# Patient Record
Sex: MALE | Race: AMERICAN INDIAN OR ALASKA NATIVE
[De-identification: names, ages, dates, MRNs, and addresses within clinical notes are randomized per-mention and may not be internally consistent; named-entity substitution may affect disease eponyms.]

---

## 2022-05-28 ENCOUNTER — HOSPITAL ENCOUNTER (EMERGENCY)
Dept: HOSPITAL 5 - ED | Age: 52
Discharge: HOME | End: 2022-05-28
Payer: COMMERCIAL

## 2022-05-28 VITALS — SYSTOLIC BLOOD PRESSURE: 118 MMHG | DIASTOLIC BLOOD PRESSURE: 65 MMHG

## 2022-05-28 DIAGNOSIS — G51.0: Primary | ICD-10-CM

## 2022-05-28 DIAGNOSIS — Z98.890: ICD-10-CM

## 2022-05-28 DIAGNOSIS — J44.9: ICD-10-CM

## 2022-05-28 LAB
ALBUMIN SERPL-MCNC: 4.4 G/DL (ref 3.9–5)
ALT SERPL-CCNC: 38 UNITS/L (ref 7–56)
APTT BLD: 34.3 SEC. (ref 24.2–36.6)
BASOPHILS # (AUTO): 0 K/MM3 (ref 0–0.1)
BASOPHILS NFR BLD AUTO: 0.7 % (ref 0–1.8)
BUN SERPL-MCNC: 13 MG/DL (ref 9–20)
BUN/CREAT SERPL: 10 %
CALCIUM SERPL-MCNC: 9.5 MG/DL (ref 8.4–10.2)
EOSINOPHIL # BLD AUTO: 0.2 K/MM3 (ref 0–0.4)
EOSINOPHIL NFR BLD AUTO: 3.5 % (ref 0–4.3)
HCT VFR BLD CALC: 47.3 % (ref 35.5–45.6)
HEMOLYSIS INDEX: 12
HGB BLD-MCNC: 16.3 GM/DL (ref 11.8–15.2)
INR PPP: 0.91 (ref 0.87–1.13)
LYMPHOCYTES # BLD AUTO: 1.6 K/MM3 (ref 1.2–5.4)
LYMPHOCYTES NFR BLD AUTO: 32.7 % (ref 13.4–35)
MCHC RBC AUTO-ENTMCNC: 35 % (ref 32–34)
MCV RBC AUTO: 94 FL (ref 84–94)
MONOCYTES # (AUTO): 0.5 K/MM3 (ref 0–0.8)
MONOCYTES % (AUTO): 9.3 % (ref 0–7.3)
PLATELET # BLD: 121 K/MM3 (ref 140–440)
RBC # BLD AUTO: 5.03 M/MM3 (ref 3.65–5.03)

## 2022-05-28 PROCEDURE — 85610 PROTHROMBIN TIME: CPT

## 2022-05-28 PROCEDURE — 85025 COMPLETE CBC W/AUTO DIFF WBC: CPT

## 2022-05-28 PROCEDURE — 71045 X-RAY EXAM CHEST 1 VIEW: CPT

## 2022-05-28 PROCEDURE — 93005 ELECTROCARDIOGRAM TRACING: CPT

## 2022-05-28 PROCEDURE — 36415 COLL VENOUS BLD VENIPUNCTURE: CPT

## 2022-05-28 PROCEDURE — 85730 THROMBOPLASTIN TIME PARTIAL: CPT

## 2022-05-28 PROCEDURE — 70450 CT HEAD/BRAIN W/O DYE: CPT

## 2022-05-28 PROCEDURE — 85670 THROMBIN TIME PLASMA: CPT

## 2022-05-28 PROCEDURE — 96360 HYDRATION IV INFUSION INIT: CPT

## 2022-05-28 PROCEDURE — 82553 CREATINE MB FRACTION: CPT

## 2022-05-28 PROCEDURE — 84484 ASSAY OF TROPONIN QUANT: CPT

## 2022-05-28 PROCEDURE — 99284 EMERGENCY DEPT VISIT MOD MDM: CPT

## 2022-05-28 PROCEDURE — 82550 ASSAY OF CK (CPK): CPT

## 2022-05-28 PROCEDURE — 80053 COMPREHEN METABOLIC PANEL: CPT

## 2022-05-28 NOTE — CAT SCAN REPORT
CT HEAD WITHOUT CONTRAST



INDICATION / CLINICAL INFORMATION: RT SIDED WEAKNESS.



TECHNIQUE: All CT scans at this location are performed using CT dose reduction for ALARA by means of 
automated exposure control. 



COMPARISON: None available.



FINDINGS:

BRAIN PARENCHYMA: No acute intracranial hemorrhage. No evidence of recent infarct. No mass effect or 
midline shift.

VENTRICULAR SYSTEM/EXTRA-AXIAL SPACES: Ventricles are normal for age. No extra-axial fluid collection
. 

ORBITS: Normal as visualized.

SKELETAL SYSTEM/SOFT TISSUES: Normal bones and soft tissues.

PARANASAL SINUSES/MASTOID AIR CELLS: No significant abnormality.



ADDITIONAL FINDINGS: None.



IMPRESSION:

No acute intracranial abnormality by CT.



Signer Name: Pardeep Cross MD 

Signed: 5/28/2022 10:19 AM

Workstation Name: [id number]

## 2022-05-28 NOTE — CONSULTATION
History of Present Illness


History of present illness: 





[facility] Consult Note





# Demographics


Consult Type: Acute Stroke Level 2 (4.5-24 hrs)





Patient Location: Emergency Room





First Name: Renato





Last Name: Eulogio Verdugo





YOB: 1970





Age: 51





Gender: Male





Facility: [facility]





Time of Initial Page (Eastern Time): 05/28/2022, 09:38





Time of Return Call (Eastern Time): 05/28/2022, 09:53








# HPI


Chief Complaint:


facial droop





History: 51M presents with facial droop. Developed numbness and tearing in the 

right face last night. Drooping and drooling noticed this morning.








# Scores


Time of exam and NIHSS (Eastern Time): 05/28/2022, 09:55





Level of Consciousness 1a: [0] = Alert; keenly responsive





LOC Questions 1b: [0] = Answers both questions correctly





LOC Commands 1c: [0] = Performs both tasks correctly





Best Gaze 2: [0] = Normal





Visual 3: [0] = No visual loss





Facial Palsy 4: [3] = Complete paralysis





Motor Arm Left 5a: [0] = No drift





Motor Arm Right 5b: [0] = No drift





Motor Leg Left 6a: [0] = No drift





Motor Leg Right 6b: [0] = No drift





Limb Ataxia 7: [0] = Absent





Sensory 8: [0] = Normal





Best Language 9: [0] = No aphasia





Dysarthria 10: [0] = Normal





Extinction and Inattention 11: [0] = No abnormality





NIHSS Total: 3








# Data


Time Head CT personally read by me (Eastern Time): 05/28/2022, 10:00





Head CT:


no bleed


preliminarily reviewed by me, please refer to radiology read for official 

reading








# Assessment


Impression:


Hornick Palsy








# Plan


Thrombolytic/Intervention: NOT IV Thrombolysis or IA Intervention candidate





Thrombolytic/Intraarterial Exclusion:


IV thrombolytic and IA intervention considered but not recommended as this 

patient's symptoms are not clinically consistent with an assumed diagnosis of 

stroke





Medication:


prednisone 1mg/kg x7 days


valacyclovir 1g q8 x7 days


Lubricating eye drops/ointment





Additional Recommendations: Eye patch/tape to limit corneal exposure risk from 

incomplete eye lid closure





Disposition: discharge








# Logistics


Telemedicine: Interactive 2 way audio and visual telecommunication technology 

was utilized during this visit








Electronically signed at 05/28/2022 10:09 (Eastern Time) by Adelfo Messina MD





Medications and Allergies


                                    Allergies











Allergy/AdvReac Type Severity Reaction Status Date / Time


 


No Known Allergies Allergy   Unverified 10/07/15 09:50











Active Meds: 


Active Medications





Sodium Chloride (Nacl 0.9% 1000 Ml)  1,000 mls @ 999 mls/hr IV BOLUS ONE


   Stop: 05/28/22 10:53

## 2022-05-28 NOTE — XRAY REPORT
. XR chest 1V ap



INDICATION / CLINICAL INFORMATION: Dyspnea.



COMPARISON: None available.



FINDINGS:



SUPPORT DEVICES: None.

HEART /PULMONARY VASCULATURE: No significant abnormality. 

LUNGS / PLEURA: Lungs are hyperexpanded but appear clear consolidation. There is flattening of the di
aphragm and blunting of the costophrenic sulci, likely chronic. No pneumothorax. 



ADDITIONAL FINDINGS: No significant additional findings.



IMPRESSION:

COPD. No evidence of acute process.



Signer Name: Pardeep Cross MD 

Signed: 5/28/2022 10:16 AM

Workstation Name: [id number]-

## 2022-05-28 NOTE — EMERGENCY DEPARTMENT REPORT
ED General Adult HPI





- General


Chief complaint: Neuro Symptoms/Deficit


Stated complaint: POSSIBLE STROKE


Time Seen by Provider: 05/28/22 09:53


Source: patient


Mode of arrival: Ambulatory


Limitations: No Limitations





- History of Present Illness


Initial comments: 





left side facial droop , started around 9 pm last night no weakness no tingling 

numbness 


-: Sudden, hour(s) (12)


Location: face, mouth, eyes


Radiation: non-radiation


Consistency: constant


Improves with: none


Worsens with: none


Associated Symptoms: denies: denies other symptoms, confusion, chest pain, cough





- Related Data


                                  Previous Rx's











 Medication  Instructions  Recorded  Last Taken  Type


 


Carboxymethylcellulos/Glycerin 15 ml OP 4XD #1 drops 05/28/22 Unknown Rx





[Lubricant 0.5-0.9% Eye Drops]    


 


Eye Patch 1 each MC DAILY #1 each 05/28/22 Unknown Rx


 


Valacyclovir HCl [Valtrex] 1,000 mg PO TID #15 05/28/22 Unknown Rx


 


predniSONE [Deltasone] 50 mg PO QDAY #7 tab 05/28/22 Unknown Rx











                                    Allergies











Allergy/AdvReac Type Severity Reaction Status Date / Time


 


No Known Allergies Allergy   Unverified 10/07/15 09:50














ED Review of Systems


ROS: 


Stated complaint: POSSIBLE STROKE


Other details as noted in HPI





Constitutional: denies: chills, fever


Eyes: denies: eye pain, eye discharge, vision change


ENT: denies: ear pain, throat pain


Respiratory: denies: cough, shortness of breath, wheezing


Cardiovascular: denies: chest pain, palpitations


Endocrine: no symptoms reported


Gastrointestinal: denies: abdominal pain, nausea, diarrhea


Genitourinary: denies: urgency, dysuria


Musculoskeletal: denies: back pain, joint swelling, arthralgia


Skin: denies: rash, lesions


Neurological: denies: headache, weakness, paresthesias


Psychiatric: denies: anxiety, depression


Hematological/Lymphatic: denies: easy bleeding, easy bruising





ED Past Medical Hx





- Past Medical History


Previous Medical History?: Yes


Hx Heart Attack/AMI: Yes


Hx COPD: Yes





- Surgical History


Past Surgical History?: Yes


Hx Coronary Stent: Yes





- Social History


Smoking Status: Never Smoker





- Medications


Home Medications: 


                                Home Medications











 Medication  Instructions  Recorded  Confirmed  Last Taken  Type


 


Carboxymethylcellulos/Glycerin 15 ml OP 4XD #1 drops 05/28/22  Unknown Rx





[Lubricant 0.5-0.9% Eye Drops]     


 


Eye Patch 1 each MC DAILY #1 each 05/28/22  Unknown Rx


 


Valacyclovir HCl [Valtrex] 1,000 mg PO TID #15 05/28/22  Unknown Rx


 


predniSONE [Deltasone] 50 mg PO QDAY #7 tab 05/28/22  Unknown Rx














ED Physical Exam





- General


Limitations: No Limitations


General appearance: alert, in no apparent distress, other (left facial droop )





- Head


Head exam: Present: atraumatic, normocephalic





- Eye


Eye exam: Present: normal appearance





- ENT


ENT exam: Present: mucous membranes moist





- Neck


Neck exam: Present: normal inspection





- Respiratory


Respiratory exam: Present: normal lung sounds bilaterally.  Absent: respiratory 

distress





- Cardiovascular


Cardiovascular Exam: Present: regular rate, normal rhythm.  Absent: systolic 

murmur, diastolic murmur, rubs, gallop





- GI/Abdominal


GI/Abdominal exam: Present: soft, normal bowel sounds





- Rectal


Rectal exam: Present: deferred





- Extremities Exam


Extremities exam: Present: normal inspection





- Back Exam


Back exam: Present: normal inspection





- Neurological Exam


Neurological exam: Present: alert, oriented X3





- Expanded Neurological Exam


  ** Expanded


Patient oriented to: Present: person, place, time


Speech: Present: fluid speech


Cranial nerves: Facial Palsy with Forehead Movement: Abnormal Right


Best Eye Response ([place]): (4) open spontaneously


Best Motor Response ([place]): (6) obeys commands


Best Verbal Response ([place]): (5) oriented


[place] Total: 15





- Psychiatric


Psychiatric exam: Present: normal affect, normal mood





- Skin


Skin exam: Present: warm, dry, intact, normal color.  Absent: rash





ED Course





                                   Vital Signs











  05/28/22 05/28/22





  10:30 10:45


 


Temperature  98.9 F


 


Pulse Rate  64


 


Respiratory 18 18





Rate  


 


Blood Pressure  129/79


 


O2 Sat by Pulse 99 100





Oximetry  














ED Medical Decision Making





- Lab Data


Result diagrams: 


                                 05/28/22 10:06








- EKG Data


-: EKG Interpreted by Me


EKG shows normal: sinus rhythm


Rate: normal





- Radiology Data


Radiology results: report reviewed, image reviewed





- Medical Decision Making





work up negative fro stroke, symtpoms of bells palsy , neurologist assessed pat

ient 


Critical care attestation.: 


If time is entered above; I have spent that time in minutes in the direct care 

of this critically ill patient, excluding procedure time.








ED Disposition


Clinical Impression: 


 Bell's palsy





Disposition: 01 HOME / SELF CARE / HOMELESS


Is pt being admited?: No


Does the pt Need Aspirin: No


Condition: Stable


Instructions:  Carreon Palsy, Adult


Referrals: 


PRIMARY CARE,MD [Primary Care Provider] - 3-5 Days

## 2023-11-17 ENCOUNTER — OFFICE VISIT (OUTPATIENT)
Dept: URBAN - METROPOLITAN AREA CLINIC 92 | Facility: CLINIC | Age: 53
End: 2023-11-17

## 2023-11-17 RX ORDER — ASPIRIN 81 MG/1
TABLET, COATED ORAL
Qty: 0 | Refills: 0 | COMMUNITY
Start: 1900-01-01

## 2023-11-17 RX ORDER — FLUTICASONE FUROATE AND VILANTEROL TRIFENATATE 200; 25 UG/1; UG/1
INHALE 1 PUFF BY INHALATION ROUTE ONCE DAILY AT THE SAME TIME EACH DAY POWDER RESPIRATORY (INHALATION) 1
Qty: 1 | Refills: 0 | COMMUNITY
Start: 1900-01-01

## 2023-11-17 RX ORDER — UMECLIDINIUM 62.5 UG/1
INHALE 1 PUFF (62.5 MCG) BY INHALATION ROUTE ONCE DAILY AT THE SAME TIME EACH DAY AEROSOL, POWDER ORAL 1
Qty: 1 | Refills: 0 | COMMUNITY
Start: 1900-01-01

## 2023-11-17 RX ORDER — ATORVASTATIN CALCIUM 40 MG/1
TABLET, FILM COATED ORAL
Qty: 0 | Refills: 0 | COMMUNITY
Start: 1900-01-01

## 2023-11-17 RX ORDER — CLOPIDOGREL 75 MG/1
TAKE 1 TABLET (75 MG) BY ORAL ROUTE ONCE DAILY TABLET ORAL 1
Qty: 0 | Refills: 0 | COMMUNITY
Start: 1900-01-01

## 2023-11-17 RX ORDER — LISINOPRIL 10 MG/1
TABLET ORAL
Qty: 0 | Refills: 0 | COMMUNITY
Start: 1900-01-01

## 2023-11-17 RX ORDER — ALBUTEROL SULFATE 5 MG/ML
SOLUTION, NON-ORAL INHALATION
Qty: 0 | Refills: 0 | COMMUNITY
Start: 1900-01-01

## 2023-11-17 RX ORDER — ABACAVIR SULFATE, DOLUTEGRAVIR SODIUM, LAMIVUDINE 600; 50; 300 MG/1; MG/1; MG/1
TAKE 1 TABLET BY ORAL ROUTE ONCE DAILY TABLET, FILM COATED ORAL 1
Qty: 0 | Refills: 0 | COMMUNITY
Start: 1900-01-01

## 2023-11-21 ENCOUNTER — TELEPHONE ENCOUNTER (OUTPATIENT)
Dept: URBAN - METROPOLITAN AREA CLINIC 92 | Facility: CLINIC | Age: 53
End: 2023-11-21

## 2023-11-21 ENCOUNTER — CLAIMS CREATED FROM THE CLAIM WINDOW (OUTPATIENT)
Dept: URBAN - METROPOLITAN AREA CLINIC 92 | Facility: CLINIC | Age: 53
End: 2023-11-21
Payer: COMMERCIAL

## 2023-11-21 VITALS
BODY MASS INDEX: 21.94 KG/M2 | HEIGHT: 67 IN | TEMPERATURE: 96.1 F | HEART RATE: 57 BPM | WEIGHT: 139.8 LBS | SYSTOLIC BLOOD PRESSURE: 128 MMHG | DIASTOLIC BLOOD PRESSURE: 87 MMHG

## 2023-11-21 DIAGNOSIS — K59.04 CHRONIC IDIOPATHIC CONSTIPATION: ICD-10-CM

## 2023-11-21 DIAGNOSIS — Z80.0 FAMILY HISTORY OF COLON CANCER: ICD-10-CM

## 2023-11-21 DIAGNOSIS — Z86.010 PERSONAL HISTORY OF COLONIC POLYPS: ICD-10-CM

## 2023-11-21 PROBLEM — 82934008: Status: ACTIVE | Noted: 2023-11-21

## 2023-11-21 PROBLEM — 428283002: Status: ACTIVE | Noted: 2023-11-21

## 2023-11-21 PROCEDURE — 99243 OFF/OP CNSLTJ NEW/EST LOW 30: CPT

## 2023-11-21 PROCEDURE — 99203 OFFICE O/P NEW LOW 30 MIN: CPT

## 2023-11-21 RX ORDER — ALBUTEROL SULFATE 5 MG/ML
SOLUTION, NON-ORAL INHALATION
Qty: 0 | Refills: 0 | Status: ACTIVE | COMMUNITY
Start: 1900-01-01

## 2023-11-21 RX ORDER — ASPIRIN 81 MG/1
TABLET, COATED ORAL
Qty: 0 | Refills: 0 | Status: ACTIVE | COMMUNITY
Start: 1900-01-01

## 2023-11-21 RX ORDER — POLYETHYLENE GLYCOL-3350 AND ELECTROLYTES WITH FLAVOR PACK 240; 5.84; 2.98; 6.72; 22.72 G/278.26G; G/278.26G; G/278.26G; G/278.26G; G/278.26G
4000ML POWDER, FOR SOLUTION ORAL
Qty: 4000 MILLILITER | Refills: 0 | OUTPATIENT
Start: 2023-11-21 | End: 2023-11-22

## 2023-11-21 RX ORDER — FLUTICASONE FUROATE AND VILANTEROL TRIFENATATE 200; 25 UG/1; UG/1
INHALE 1 PUFF BY INHALATION ROUTE ONCE DAILY AT THE SAME TIME EACH DAY POWDER RESPIRATORY (INHALATION) 1
Qty: 1 | Refills: 0 | Status: ACTIVE | COMMUNITY
Start: 1900-01-01

## 2023-11-21 RX ORDER — CLOPIDOGREL 75 MG/1
TAKE 1 TABLET (75 MG) BY ORAL ROUTE ONCE DAILY TABLET ORAL 1
Qty: 0 | Refills: 0 | Status: ACTIVE | COMMUNITY
Start: 1900-01-01

## 2023-11-21 RX ORDER — UMECLIDINIUM 62.5 UG/1
INHALE 1 PUFF (62.5 MCG) BY INHALATION ROUTE ONCE DAILY AT THE SAME TIME EACH DAY AEROSOL, POWDER ORAL 1
Qty: 1 | Refills: 0 | Status: ACTIVE | COMMUNITY
Start: 1900-01-01

## 2023-11-21 RX ORDER — LISINOPRIL 10 MG/1
TABLET ORAL
Qty: 0 | Refills: 0 | Status: ACTIVE | COMMUNITY
Start: 1900-01-01

## 2023-11-21 RX ORDER — ABACAVIR SULFATE, DOLUTEGRAVIR SODIUM, LAMIVUDINE 600; 50; 300 MG/1; MG/1; MG/1
TAKE 1 TABLET BY ORAL ROUTE ONCE DAILY TABLET, FILM COATED ORAL 1
Qty: 0 | Refills: 0 | Status: ACTIVE | COMMUNITY
Start: 1900-01-01

## 2023-11-21 RX ORDER — ATORVASTATIN CALCIUM 40 MG/1
TABLET, FILM COATED ORAL
Qty: 0 | Refills: 0 | Status: ACTIVE | COMMUNITY
Start: 1900-01-01

## 2023-11-21 NOTE — HPI-TODAY'S VISIT:
This patient was referred by VANESA Vogel  for an evaluation of partial bowel obstruction.  A copy of this will be sent to the referring provider. PMH includes CAD (s/p PCI x3 to RCA 8/2016), and chronic anticoagulation currently on aspirin and Plavix, HIV on HAART, Triumeq, levels undetected.   Patient notes when he uses the restroom he does not have complete BM. Symptoms ongoing over 5 years, just brought it to his PCPs attention. In a week, he has a BM on 3-4 days out of the week. Admits to mild abdominal discomfort. Denies diarrhea, hematochezia, or melena. Trialed Dulcolax and MiraLax with limited benefit. Denies upper GI issues. Patient is a former smoker. Rare alcohol use. Daily marijuana use.  Had a colonoscopy in 2017  which revealed polyps per patient, done at [place]. Brother dx with Stage IV colon cancer last year, age 58.    Dr. Yvonne Triplett is his cardiologist. No pacemaker, no home O2. Has a nebulizer for COPD, last used 9-10 months ago.

## 2023-12-06 ENCOUNTER — TELEPHONE ENCOUNTER (OUTPATIENT)
Dept: URBAN - METROPOLITAN AREA CLINIC 92 | Facility: CLINIC | Age: 53
End: 2023-12-06

## 2024-03-05 ENCOUNTER — LAB (OUTPATIENT)
Dept: URBAN - METROPOLITAN AREA CLINIC 4 | Facility: CLINIC | Age: 54
End: 2024-03-05
Payer: COMMERCIAL

## 2024-03-05 ENCOUNTER — COLON (OUTPATIENT)
Dept: URBAN - METROPOLITAN AREA SURGERY CENTER 16 | Facility: SURGERY CENTER | Age: 54
End: 2024-03-05
Payer: COMMERCIAL

## 2024-03-05 DIAGNOSIS — A63.0 ANOGENITAL (VENEREAL) WARTS: ICD-10-CM

## 2024-03-05 DIAGNOSIS — Z80.0 BROTHER AT YOUNG AGE FAMILY HISTORY OF COLON CANCER: ICD-10-CM

## 2024-03-05 DIAGNOSIS — A63.0 ANAL CONDYLOMA: ICD-10-CM

## 2024-03-05 DIAGNOSIS — Z12.11 COLON CANCER SCREENING: ICD-10-CM

## 2024-03-05 PROCEDURE — 45380 COLONOSCOPY AND BIOPSY: CPT | Performed by: INTERNAL MEDICINE

## 2024-03-05 PROCEDURE — 88342 IMHCHEM/IMCYTCHM 1ST ANTB: CPT | Performed by: PATHOLOGY

## 2024-03-05 PROCEDURE — 88341 IMHCHEM/IMCYTCHM EA ADD ANTB: CPT | Performed by: PATHOLOGY

## 2024-03-05 PROCEDURE — 88305 TISSUE EXAM BY PATHOLOGIST: CPT | Performed by: PATHOLOGY

## 2024-03-05 RX ORDER — FLUTICASONE FUROATE AND VILANTEROL TRIFENATATE 200; 25 UG/1; UG/1
INHALE 1 PUFF BY INHALATION ROUTE ONCE DAILY AT THE SAME TIME EACH DAY POWDER RESPIRATORY (INHALATION) 1
Qty: 1 | Refills: 0 | Status: ACTIVE | COMMUNITY
Start: 1900-01-01

## 2024-03-05 RX ORDER — CLOPIDOGREL 75 MG/1
TAKE 1 TABLET (75 MG) BY ORAL ROUTE ONCE DAILY TABLET ORAL 1
Qty: 0 | Refills: 0 | Status: ACTIVE | COMMUNITY
Start: 1900-01-01

## 2024-03-05 RX ORDER — LISINOPRIL 10 MG/1
TABLET ORAL
Qty: 0 | Refills: 0 | Status: ACTIVE | COMMUNITY
Start: 1900-01-01

## 2024-03-05 RX ORDER — ABACAVIR SULFATE, DOLUTEGRAVIR SODIUM, LAMIVUDINE 600; 50; 300 MG/1; MG/1; MG/1
TAKE 1 TABLET BY ORAL ROUTE ONCE DAILY TABLET, FILM COATED ORAL 1
Qty: 0 | Refills: 0 | Status: ACTIVE | COMMUNITY
Start: 1900-01-01

## 2024-03-05 RX ORDER — ASPIRIN 81 MG/1
TABLET, COATED ORAL
Qty: 0 | Refills: 0 | Status: ACTIVE | COMMUNITY
Start: 1900-01-01

## 2024-03-05 RX ORDER — ATORVASTATIN CALCIUM 40 MG/1
TABLET, FILM COATED ORAL
Qty: 0 | Refills: 0 | Status: ACTIVE | COMMUNITY
Start: 1900-01-01

## 2024-03-05 RX ORDER — UMECLIDINIUM 62.5 UG/1
INHALE 1 PUFF (62.5 MCG) BY INHALATION ROUTE ONCE DAILY AT THE SAME TIME EACH DAY AEROSOL, POWDER ORAL 1
Qty: 1 | Refills: 0 | Status: ACTIVE | COMMUNITY
Start: 1900-01-01

## 2024-03-05 RX ORDER — ALBUTEROL SULFATE 5 MG/ML
SOLUTION, NON-ORAL INHALATION
Qty: 0 | Refills: 0 | Status: ACTIVE | COMMUNITY
Start: 1900-01-01

## 2024-03-21 ENCOUNTER — OV EP (OUTPATIENT)
Dept: URBAN - METROPOLITAN AREA CLINIC 92 | Facility: CLINIC | Age: 54
End: 2024-03-21
Payer: COMMERCIAL

## 2024-03-21 VITALS
WEIGHT: 137 LBS | DIASTOLIC BLOOD PRESSURE: 89 MMHG | SYSTOLIC BLOOD PRESSURE: 141 MMHG | HEART RATE: 68 BPM | BODY MASS INDEX: 21.5 KG/M2 | HEIGHT: 67 IN | TEMPERATURE: 97 F

## 2024-03-21 DIAGNOSIS — Z86.010 PERSONAL HISTORY OF COLONIC POLYPS: ICD-10-CM

## 2024-03-21 DIAGNOSIS — K59.04 CHRONIC IDIOPATHIC CONSTIPATION: ICD-10-CM

## 2024-03-21 DIAGNOSIS — A63.0 ANAL CONDYLOMA: ICD-10-CM

## 2024-03-21 DIAGNOSIS — Z80.0 FAMILY HISTORY OF COLON CANCER: ICD-10-CM

## 2024-03-21 PROBLEM — 240597001: Status: ACTIVE | Noted: 2024-03-21

## 2024-03-21 PROCEDURE — 99213 OFFICE O/P EST LOW 20 MIN: CPT

## 2024-03-21 RX ORDER — ALBUTEROL SULFATE 5 MG/ML
SOLUTION, NON-ORAL INHALATION
Qty: 0 | Refills: 0 | Status: ACTIVE | COMMUNITY
Start: 1900-01-01

## 2024-03-21 RX ORDER — ABACAVIR SULFATE, DOLUTEGRAVIR SODIUM, LAMIVUDINE 600; 50; 300 MG/1; MG/1; MG/1
TAKE 1 TABLET BY ORAL ROUTE ONCE DAILY TABLET, FILM COATED ORAL 1
Qty: 0 | Refills: 0 | Status: ACTIVE | COMMUNITY
Start: 1900-01-01

## 2024-03-21 RX ORDER — LINACLOTIDE 145 UG/1
1 CAPSULE AT LEAST 30 MINUTES BEFORE THE FIRST MEAL OF THE DAY ON AN EMPTY STOMACH CAPSULE, GELATIN COATED ORAL ONCE A DAY
Qty: 90 CAPSULE | Refills: 3 | OUTPATIENT
Start: 2024-03-21 | End: 2025-03-16

## 2024-03-21 RX ORDER — FLUTICASONE FUROATE, UMECLIDINIUM BROMIDE AND VILANTEROL TRIFENATATE 100; 62.5; 25 UG/1; UG/1; UG/1
1 PUFF POWDER RESPIRATORY (INHALATION) ONCE A DAY
Status: ACTIVE | COMMUNITY

## 2024-03-21 RX ORDER — CLOPIDOGREL 75 MG/1
TAKE 1 TABLET (75 MG) BY ORAL ROUTE ONCE DAILY TABLET ORAL 1
Qty: 0 | Refills: 0 | Status: ACTIVE | COMMUNITY
Start: 1900-01-01

## 2024-03-21 RX ORDER — LISINOPRIL 10 MG/1
TABLET ORAL
Qty: 0 | Refills: 0 | Status: ON HOLD | COMMUNITY
Start: 1900-01-01

## 2024-03-21 RX ORDER — ATORVASTATIN CALCIUM 40 MG/1
TABLET, FILM COATED ORAL
Qty: 0 | Refills: 0 | Status: ACTIVE | COMMUNITY
Start: 1900-01-01

## 2024-03-21 RX ORDER — FLUTICASONE FUROATE AND VILANTEROL TRIFENATATE 200; 25 UG/1; UG/1
INHALE 1 PUFF BY INHALATION ROUTE ONCE DAILY AT THE SAME TIME EACH DAY POWDER RESPIRATORY (INHALATION) 1
Qty: 1 | Refills: 0 | Status: ON HOLD | COMMUNITY
Start: 1900-01-01

## 2024-03-21 RX ORDER — UMECLIDINIUM 62.5 UG/1
INHALE 1 PUFF (62.5 MCG) BY INHALATION ROUTE ONCE DAILY AT THE SAME TIME EACH DAY AEROSOL, POWDER ORAL 1
Qty: 1 | Refills: 0 | Status: ON HOLD | COMMUNITY
Start: 1900-01-01

## 2024-03-21 RX ORDER — ASPIRIN 81 MG/1
TABLET, COATED ORAL
Qty: 0 | Refills: 0 | Status: ACTIVE | COMMUNITY
Start: 1900-01-01

## 2024-03-21 NOTE — HPI-TODAY'S VISIT:
This patient was referred by VANESA Vogel  who presents in follow up from his colonoscopy.  A copy of this will be sent to the referring provider. PMH includes CAD (s/p PCI x3 to RCA 8/2016), and chronic anticoagulation currently on aspirin and Plavix, HIV on HAART, Triumeq, levels undetected.  Dr. Yvonne Triplett is his cardiologist. No pacemaker, no home O2. Has a nebulizer for COPD, last used 9-10 months ago.  Initial visit, patient notes when he uses the restroom he does not have complete BM. Symptoms ongoing over 5 years, just brought it to his PCPs attention. In a week, he has a BM on 3-4 days out of the week. Admits to mild abdominal discomfort. Denies diarrhea, hematochezia, or melena. Trialed Dulcolax and MiraLax with limited benefit. Denies upper GI issues. Patient is a former smoker. Rare alcohol use. Daily marijuana use.   Colonoscopy 3/05/24 with Dr. Martines revealed a fair colon prep, anal condyloma bx neg. for dysplasia or malignancy, 3 yr repeat. Had a colonoscopy in 2017  which revealed polyps per patient, done at [place]. Brother dx with Stage IV colon cancer last year, age 58.   Today, patient reports he is scheduled to see [facility] on 4/15/24. He had surgery for anal condyloma 5 years ago done at [facility]. Notes his BM have regulated. He takes Linzess as needed about once every 2-3 weeks with benefit. Denies abdominal pain, hematochezia, or melena.